# Patient Record
Sex: MALE | Race: WHITE | NOT HISPANIC OR LATINO | URBAN - METROPOLITAN AREA
[De-identification: names, ages, dates, MRNs, and addresses within clinical notes are randomized per-mention and may not be internally consistent; named-entity substitution may affect disease eponyms.]

---

## 2017-01-13 ENCOUNTER — OUTPATIENT (OUTPATIENT)
Dept: OUTPATIENT SERVICES | Facility: HOSPITAL | Age: 67
LOS: 1 days | End: 2017-01-13
Payer: MEDICARE

## 2017-01-13 PROCEDURE — 73564 X-RAY EXAM KNEE 4 OR MORE: CPT

## 2017-01-13 PROCEDURE — 73564 X-RAY EXAM KNEE 4 OR MORE: CPT | Mod: 26,RT

## 2017-04-05 ENCOUNTER — APPOINTMENT (OUTPATIENT)
Dept: HEART AND VASCULAR | Facility: CLINIC | Age: 67
End: 2017-04-05

## 2017-04-05 VITALS
DIASTOLIC BLOOD PRESSURE: 70 MMHG | HEART RATE: 73 BPM | BODY MASS INDEX: 24.59 KG/M2 | WEIGHT: 191.6 LBS | RESPIRATION RATE: 15 BRPM | OXYGEN SATURATION: 97 % | HEIGHT: 74 IN | SYSTOLIC BLOOD PRESSURE: 110 MMHG

## 2017-04-06 LAB
ALBUMIN SERPL ELPH-MCNC: 4.7 G/DL
ALP BLD-CCNC: 73 U/L
ALT SERPL-CCNC: 20 U/L
AST SERPL-CCNC: 23 U/L
BILIRUB DIRECT SERPL-MCNC: 0.3 MG/DL
BILIRUB INDIRECT SERPL-MCNC: 1 MG/DL
BILIRUB SERPL-MCNC: 1.3 MG/DL
CHOLEST SERPL-MCNC: 158 MG/DL
CHOLEST/HDLC SERPL: 2.6 RATIO
HDLC SERPL-MCNC: 61 MG/DL
LDLC SERPL CALC-MCNC: 79 MG/DL
PROT SERPL-MCNC: 7.3 G/DL
TRIGL SERPL-MCNC: 92 MG/DL

## 2017-09-26 VITALS
OXYGEN SATURATION: 98 % | RESPIRATION RATE: 16 BRPM | TEMPERATURE: 98 F | DIASTOLIC BLOOD PRESSURE: 93 MMHG | SYSTOLIC BLOOD PRESSURE: 132 MMHG | HEIGHT: 73 IN | WEIGHT: 191.8 LBS | HEART RATE: 78 BPM

## 2017-09-26 NOTE — ASU PATIENT PROFILE, ADULT - PSH
S/P hernia surgery  Inguinal x2  S/P tonsillectomy  as a child  S/P TURP    Surgery, elective  Right hand surgery

## 2017-09-26 NOTE — ASU PATIENT PROFILE, ADULT - PMH
BPH (benign prostatic hyperplasia)    Edema  mild LLE  Gallbladder polyp    GERD (gastroesophageal reflux disease)    HLD (hyperlipidemia)

## 2017-09-27 ENCOUNTER — OUTPATIENT (OUTPATIENT)
Dept: INPATIENT UNIT | Facility: HOSPITAL | Age: 67
LOS: 1 days | Discharge: HOME | End: 2017-09-27
Payer: MEDICARE

## 2017-09-27 VITALS
DIASTOLIC BLOOD PRESSURE: 71 MMHG | HEART RATE: 772 BPM | OXYGEN SATURATION: 96 % | TEMPERATURE: 98 F | RESPIRATION RATE: 16 BRPM | SYSTOLIC BLOOD PRESSURE: 129 MMHG

## 2017-09-27 DIAGNOSIS — Z90.89 ACQUIRED ABSENCE OF OTHER ORGANS: Chronic | ICD-10-CM

## 2017-09-27 DIAGNOSIS — Z41.9 ENCOUNTER FOR PROCEDURE FOR PURPOSES OTHER THAN REMEDYING HEALTH STATE, UNSPECIFIED: Chronic | ICD-10-CM

## 2017-09-27 DIAGNOSIS — Z98.890 OTHER SPECIFIED POSTPROCEDURAL STATES: Chronic | ICD-10-CM

## 2017-09-27 DIAGNOSIS — Z90.79 ACQUIRED ABSENCE OF OTHER GENITAL ORGAN(S): Chronic | ICD-10-CM

## 2017-09-27 PROCEDURE — 29827 SHO ARTHRS SRG RT8TR CUF RPR: CPT | Mod: RT

## 2017-09-27 PROCEDURE — C1713: CPT

## 2017-09-27 PROCEDURE — 29823 SHO ARTHRS SRG XTNSV DBRDMT: CPT | Mod: RT

## 2017-09-27 PROCEDURE — 29826 SHO ARTHRS SRG DECOMPRESSION: CPT | Mod: RT

## 2017-10-04 DIAGNOSIS — M75.51 BURSITIS OF RIGHT SHOULDER: ICD-10-CM

## 2017-10-04 DIAGNOSIS — M75.101 UNSPECIFIED ROTATOR CUFF TEAR OR RUPTURE OF RIGHT SHOULDER, NOT SPECIFIED AS TRAUMATIC: ICD-10-CM

## 2017-10-04 DIAGNOSIS — M75.41 IMPINGEMENT SYNDROME OF RIGHT SHOULDER: ICD-10-CM
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
## 2017-10-04 DIAGNOSIS — S43.431S SUPERIOR GLENOID LABRUM LESION OF RIGHT SHOULDER, SEQUELA: ICD-10-CM

## 2018-01-25 PROBLEM — R60.9 EDEMA, UNSPECIFIED: Chronic | Status: ACTIVE | Noted: 2017-09-26

## 2018-01-25 PROBLEM — K82.4 CHOLESTEROLOSIS OF GALLBLADDER: Chronic | Status: ACTIVE | Noted: 2017-09-26

## 2018-01-25 PROBLEM — N40.0 BENIGN PROSTATIC HYPERPLASIA WITHOUT LOWER URINARY TRACT SYMPTOMS: Chronic | Status: ACTIVE | Noted: 2017-09-26

## 2018-01-25 PROBLEM — E78.5 HYPERLIPIDEMIA, UNSPECIFIED: Chronic | Status: ACTIVE | Noted: 2017-09-26

## 2018-01-25 PROBLEM — K21.9 GASTRO-ESOPHAGEAL REFLUX DISEASE WITHOUT ESOPHAGITIS: Chronic | Status: ACTIVE | Noted: 2017-09-26

## 2018-02-07 ENCOUNTER — APPOINTMENT (OUTPATIENT)
Dept: HEART AND VASCULAR | Facility: CLINIC | Age: 68
End: 2018-02-07
Payer: MEDICARE

## 2018-02-07 VITALS
OXYGEN SATURATION: 97 % | DIASTOLIC BLOOD PRESSURE: 70 MMHG | BODY MASS INDEX: 24.59 KG/M2 | HEART RATE: 69 BPM | HEIGHT: 74 IN | TEMPERATURE: 97.2 F | WEIGHT: 191.6 LBS | SYSTOLIC BLOOD PRESSURE: 100 MMHG

## 2018-02-07 DIAGNOSIS — R20.2 PARESTHESIA OF SKIN: ICD-10-CM

## 2018-02-07 PROCEDURE — 93000 ELECTROCARDIOGRAM COMPLETE: CPT

## 2018-02-07 PROCEDURE — 36415 COLL VENOUS BLD VENIPUNCTURE: CPT

## 2018-02-07 PROCEDURE — 99214 OFFICE O/P EST MOD 30 MIN: CPT | Mod: 25

## 2018-02-08 LAB
ALBUMIN SERPL ELPH-MCNC: 4.6 G/DL
ALP BLD-CCNC: 74 U/L
ALT SERPL-CCNC: 20 U/L
ANION GAP SERPL CALC-SCNC: 16 MMOL/L
AST SERPL-CCNC: 21 U/L
BASOPHILS # BLD AUTO: 0.04 K/UL
BASOPHILS NFR BLD AUTO: 0.7 %
BILIRUB SERPL-MCNC: 1 MG/DL
BUN SERPL-MCNC: 15 MG/DL
CALCIUM SERPL-MCNC: 9.7 MG/DL
CHLORIDE SERPL-SCNC: 102 MMOL/L
CHOLEST SERPL-MCNC: 149 MG/DL
CHOLEST/HDLC SERPL: 2.9 RATIO
CO2 SERPL-SCNC: 23 MMOL/L
CREAT SERPL-MCNC: 1.08 MG/DL
EOSINOPHIL # BLD AUTO: 0.14 K/UL
EOSINOPHIL NFR BLD AUTO: 2.3 %
FOLATE SERPL-MCNC: >20 NG/ML
GLUCOSE SERPL-MCNC: 91 MG/DL
HBA1C MFR BLD HPLC: 5.3 %
HCT VFR BLD CALC: 45.6 %
HDLC SERPL-MCNC: 52 MG/DL
HGB BLD-MCNC: 14.8 G/DL
IMM GRANULOCYTES NFR BLD AUTO: 0.2 %
LDLC SERPL CALC-MCNC: 82 MG/DL
LYMPHOCYTES # BLD AUTO: 1.44 K/UL
LYMPHOCYTES NFR BLD AUTO: 23.9 %
MAN DIFF?: NORMAL
MCHC RBC-ENTMCNC: 31.6 PG
MCHC RBC-ENTMCNC: 32.5 GM/DL
MCV RBC AUTO: 97.2 FL
MONOCYTES # BLD AUTO: 0.54 K/UL
MONOCYTES NFR BLD AUTO: 9 %
NEUTROPHILS # BLD AUTO: 3.85 K/UL
NEUTROPHILS NFR BLD AUTO: 63.9 %
PLATELET # BLD AUTO: 116 K/UL
POTASSIUM SERPL-SCNC: 4.5 MMOL/L
PROT SERPL-MCNC: 6.9 G/DL
RBC # BLD: 4.69 M/UL
RBC # FLD: 14 %
SODIUM SERPL-SCNC: 141 MMOL/L
TRIGL SERPL-MCNC: 75 MG/DL
TSH SERPL-ACNC: 0.89 UIU/ML
VIT B12 SERPL-MCNC: 844 PG/ML
WBC # FLD AUTO: 6.02 K/UL

## 2018-12-12 ENCOUNTER — APPOINTMENT (OUTPATIENT)
Dept: HEART AND VASCULAR | Facility: CLINIC | Age: 68
End: 2018-12-12
Payer: MEDICARE

## 2018-12-12 VITALS
TEMPERATURE: 97.7 F | RESPIRATION RATE: 15 BRPM | BODY MASS INDEX: 24.51 KG/M2 | SYSTOLIC BLOOD PRESSURE: 120 MMHG | OXYGEN SATURATION: 97 % | HEIGHT: 74 IN | WEIGHT: 191 LBS | DIASTOLIC BLOOD PRESSURE: 80 MMHG | HEART RATE: 80 BPM

## 2018-12-12 PROCEDURE — 93000 ELECTROCARDIOGRAM COMPLETE: CPT

## 2018-12-12 PROCEDURE — 99214 OFFICE O/P EST MOD 30 MIN: CPT

## 2018-12-12 PROCEDURE — 93306 TTE W/DOPPLER COMPLETE: CPT

## 2018-12-12 NOTE — HISTORY OF PRESENT ILLNESS
[FreeTextEntry1] : notes 2-3 months of sharp CP\par happens at rest, self limited and lasts seconds\par feels fine with physical exertion\par otherwise has felt at baseline\par functional capacity unchanged\par no dyspnea or palps

## 2018-12-12 NOTE — PHYSICAL EXAM
[General Appearance - Well Developed] : well developed [Normal Appearance] : normal appearance [Well Groomed] : well groomed [General Appearance - Well Nourished] : well nourished [No Deformities] : no deformities [General Appearance - In No Acute Distress] : no acute distress [Normal Conjunctiva] : the conjunctiva exhibited no abnormalities [Eyelids - No Xanthelasma] : the eyelids demonstrated no xanthelasmas [Normal Oral Mucosa] : normal oral mucosa [No Oral Pallor] : no oral pallor [No Oral Cyanosis] : no oral cyanosis [Normal Jugular Venous A Waves Present] : normal jugular venous A waves present [Normal Jugular Venous V Waves Present] : normal jugular venous V waves present [No Jugular Venous Mayes A Waves] : no jugular venous mayes A waves [Respiration, Rhythm And Depth] : normal respiratory rhythm and effort [Exaggerated Use Of Accessory Muscles For Inspiration] : no accessory muscle use [Auscultation Breath Sounds / Voice Sounds] : lungs were clear to auscultation bilaterally [Heart Rate And Rhythm] : heart rate and rhythm were normal [Heart Sounds] : normal S1 and S2 [Murmurs] : no murmurs present [Abdomen Soft] : soft [Abdomen Tenderness] : non-tender [Abdomen Mass (___ Cm)] : no abdominal mass palpated [Abnormal Walk] : normal gait [Gait - Sufficient For Exercise Testing] : the gait was sufficient for exercise testing [Nail Clubbing] : no clubbing of the fingernails [Cyanosis, Localized] : no localized cyanosis [Petechial Hemorrhages (___cm)] : no petechial hemorrhages [Skin Color & Pigmentation] : normal skin color and pigmentation [] : no rash [No Venous Stasis] : no venous stasis [Skin Lesions] : no skin lesions [No Skin Ulcers] : no skin ulcer [No Xanthoma] : no  xanthoma was observed [Oriented To Time, Place, And Person] : oriented to person, place, and time [Affect] : the affect was normal [Mood] : the mood was normal [No Anxiety] : not feeling anxious

## 2019-03-15 ENCOUNTER — APPOINTMENT (OUTPATIENT)
Dept: HEART AND VASCULAR | Facility: CLINIC | Age: 69
End: 2019-03-15
Payer: MEDICARE

## 2019-03-15 PROCEDURE — 93306 TTE W/DOPPLER COMPLETE: CPT

## 2019-03-15 PROCEDURE — 36415 COLL VENOUS BLD VENIPUNCTURE: CPT

## 2019-03-15 PROCEDURE — 93015 CV STRESS TEST SUPVJ I&R: CPT

## 2019-03-15 PROCEDURE — 99214 OFFICE O/P EST MOD 30 MIN: CPT | Mod: 25

## 2019-03-15 PROCEDURE — 93880 EXTRACRANIAL BILAT STUDY: CPT

## 2019-03-15 NOTE — HISTORY OF PRESENT ILLNESS
[FreeTextEntry1] : notes ongoing  sharp CP\par happens at rest, self limited and lasts seconds\par feels fine with physical exertion\par otherwise has felt at baseline\par functional capacity unchanged\par no dyspnea or palps

## 2019-03-15 NOTE — DISCUSSION/SUMMARY
[FreeTextEntry1] : cp, stress test neg\par consider nuclear stress if sx persist\par rfm discussed \par check labs

## 2019-03-18 LAB
ALBUMIN SERPL ELPH-MCNC: 4.4 G/DL
ALP BLD-CCNC: 71 U/L
ALT SERPL-CCNC: 19 U/L
ANION GAP SERPL CALC-SCNC: 12 MMOL/L
AST SERPL-CCNC: 22 U/L
BASOPHILS # BLD AUTO: 0.05 K/UL
BASOPHILS NFR BLD AUTO: 0.8 %
BILIRUB SERPL-MCNC: 1.4 MG/DL
BUN SERPL-MCNC: 19 MG/DL
CALCIUM SERPL-MCNC: 9.8 MG/DL
CHLORIDE SERPL-SCNC: 106 MMOL/L
CHOLEST SERPL-MCNC: 144 MG/DL
CHOLEST/HDLC SERPL: 2.6 RATIO
CO2 SERPL-SCNC: 26 MMOL/L
CREAT SERPL-MCNC: 1.08 MG/DL
EOSINOPHIL # BLD AUTO: 0.15 K/UL
EOSINOPHIL NFR BLD AUTO: 2.3 %
GLUCOSE SERPL-MCNC: 85 MG/DL
HBA1C MFR BLD HPLC: 5.3 %
HCT VFR BLD CALC: 44.5 %
HDLC SERPL-MCNC: 55 MG/DL
HGB BLD-MCNC: 14 G/DL
IMM GRANULOCYTES NFR BLD AUTO: 0.2 %
LDLC SERPL CALC-MCNC: 75 MG/DL
LYMPHOCYTES # BLD AUTO: 1.43 K/UL
LYMPHOCYTES NFR BLD AUTO: 22.3 %
MAN DIFF?: NORMAL
MCHC RBC-ENTMCNC: 31.1 PG
MCHC RBC-ENTMCNC: 31.5 GM/DL
MCV RBC AUTO: 98.9 FL
MONOCYTES # BLD AUTO: 0.59 K/UL
MONOCYTES NFR BLD AUTO: 9.2 %
NEUTROPHILS # BLD AUTO: 4.17 K/UL
NEUTROPHILS NFR BLD AUTO: 65.2 %
PLATELET # BLD AUTO: 117 K/UL
POTASSIUM SERPL-SCNC: 5 MMOL/L
PROT SERPL-MCNC: 6.6 G/DL
RBC # BLD: 4.5 M/UL
RBC # FLD: 13.3 %
SODIUM SERPL-SCNC: 143 MMOL/L
TRIGL SERPL-MCNC: 68 MG/DL
TSH SERPL-ACNC: 0.89 UIU/ML
WBC # FLD AUTO: 6.4 K/UL

## 2019-03-26 ENCOUNTER — APPOINTMENT (OUTPATIENT)
Dept: HEART AND VASCULAR | Facility: CLINIC | Age: 69
End: 2019-03-26
Payer: MEDICARE

## 2019-03-26 PROCEDURE — 99214 OFFICE O/P EST MOD 30 MIN: CPT | Mod: 25

## 2019-03-26 PROCEDURE — 78452 HT MUSCLE IMAGE SPECT MULT: CPT

## 2019-03-26 PROCEDURE — 93015 CV STRESS TEST SUPVJ I&R: CPT

## 2019-03-26 PROCEDURE — A9500: CPT

## 2019-03-26 NOTE — DISCUSSION/SUMMARY
[___ Month(s)] : [unfilled] month(s) [With Me] : with me [FreeTextEntry1] : mildly abln scan, but no angina on treadmill and no high risk features\par I would conclude that the cp he is having is unrelated to CAD\par would treat medically \par annual stress test\par consider intervention for angina sx if they occur

## 2019-03-31 ENCOUNTER — RX RENEWAL (OUTPATIENT)
Age: 69
End: 2019-03-31

## 2019-05-01 ENCOUNTER — OUTPATIENT (OUTPATIENT)
Dept: OUTPATIENT SERVICES | Facility: HOSPITAL | Age: 69
LOS: 1 days | Discharge: HOME | End: 2019-05-01
Payer: MEDICARE

## 2019-05-01 VITALS
HEART RATE: 76 BPM | HEIGHT: 73 IN | TEMPERATURE: 99 F | DIASTOLIC BLOOD PRESSURE: 81 MMHG | SYSTOLIC BLOOD PRESSURE: 134 MMHG | OXYGEN SATURATION: 99 % | RESPIRATION RATE: 20 BRPM | WEIGHT: 190.04 LBS

## 2019-05-01 VITALS
TEMPERATURE: 97 F | RESPIRATION RATE: 9 BRPM | DIASTOLIC BLOOD PRESSURE: 76 MMHG | SYSTOLIC BLOOD PRESSURE: 123 MMHG | OXYGEN SATURATION: 97 % | HEART RATE: 70 BPM

## 2019-05-01 DIAGNOSIS — Z41.9 ENCOUNTER FOR PROCEDURE FOR PURPOSES OTHER THAN REMEDYING HEALTH STATE, UNSPECIFIED: Chronic | ICD-10-CM

## 2019-05-01 DIAGNOSIS — Z98.890 OTHER SPECIFIED POSTPROCEDURAL STATES: Chronic | ICD-10-CM

## 2019-05-01 DIAGNOSIS — Z90.79 ACQUIRED ABSENCE OF OTHER GENITAL ORGAN(S): Chronic | ICD-10-CM

## 2019-05-01 DIAGNOSIS — Z90.89 ACQUIRED ABSENCE OF OTHER ORGANS: Chronic | ICD-10-CM

## 2019-05-01 PROCEDURE — C1713: CPT

## 2019-05-01 PROCEDURE — 29827 SHO ARTHRS SRG RT8TR CUF RPR: CPT | Mod: LT

## 2019-05-01 PROCEDURE — C1889: CPT

## 2019-05-01 PROCEDURE — 29826 SHO ARTHRS SRG DECOMPRESSION: CPT | Mod: LT

## 2019-05-01 PROCEDURE — 29823 SHO ARTHRS SRG XTNSV DBRDMT: CPT | Mod: LT

## 2019-05-01 PROCEDURE — 29824 SHO ARTHRS SRG DSTL CLAVICLC: CPT | Mod: LT

## 2019-05-01 RX ORDER — OXYCODONE AND ACETAMINOPHEN 5; 325 MG/1; MG/1
2 TABLET ORAL EVERY 4 HOURS
Qty: 0 | Refills: 0 | Status: DISCONTINUED | OUTPATIENT
Start: 2019-05-01 | End: 2019-05-01

## 2019-05-01 RX ORDER — OXYCODONE AND ACETAMINOPHEN 5; 325 MG/1; MG/1
1 TABLET ORAL EVERY 4 HOURS
Qty: 0 | Refills: 0 | Status: DISCONTINUED | OUTPATIENT
Start: 2019-05-01 | End: 2019-05-01

## 2019-05-01 RX ORDER — SODIUM CHLORIDE 9 MG/ML
1000 INJECTION, SOLUTION INTRAVENOUS
Qty: 0 | Refills: 0 | Status: DISCONTINUED | OUTPATIENT
Start: 2019-05-01 | End: 2019-05-01

## 2019-05-01 RX ORDER — MORPHINE SULFATE 50 MG/1
4 CAPSULE, EXTENDED RELEASE ORAL
Qty: 0 | Refills: 0 | Status: DISCONTINUED | OUTPATIENT
Start: 2019-05-01 | End: 2019-05-01

## 2019-05-01 NOTE — PACU DISCHARGE NOTE - COMMENTS
Pt discharged from PACU to home with spouse Nahomy as escort.  Pt had snack with fluid and tolerated well.  Ambulate to bathroom and voided approx 100cc urine.  Pt  walked to lobby for transport in stable condition.

## 2019-12-23 ENCOUNTER — APPOINTMENT (OUTPATIENT)
Dept: HEART AND VASCULAR | Facility: CLINIC | Age: 69
End: 2019-12-23
Payer: MEDICARE

## 2019-12-23 VITALS
HEART RATE: 80 BPM | SYSTOLIC BLOOD PRESSURE: 124 MMHG | TEMPERATURE: 97.6 F | BODY MASS INDEX: 24.64 KG/M2 | OXYGEN SATURATION: 97 % | DIASTOLIC BLOOD PRESSURE: 80 MMHG | WEIGHT: 192 LBS | RESPIRATION RATE: 15 BRPM | HEIGHT: 74 IN

## 2019-12-23 PROCEDURE — 93000 ELECTROCARDIOGRAM COMPLETE: CPT

## 2019-12-23 PROCEDURE — 99214 OFFICE O/P EST MOD 30 MIN: CPT

## 2019-12-23 NOTE — PHYSICAL EXAM
[Well Groomed] : well groomed [Normal Appearance] : normal appearance [General Appearance - Well Developed] : well developed [No Deformities] : no deformities [General Appearance - Well Nourished] : well nourished [General Appearance - In No Acute Distress] : no acute distress [Normal Conjunctiva] : the conjunctiva exhibited no abnormalities [Eyelids - No Xanthelasma] : the eyelids demonstrated no xanthelasmas [Normal Oral Mucosa] : normal oral mucosa [No Oral Pallor] : no oral pallor [No Oral Cyanosis] : no oral cyanosis [Normal Jugular Venous A Waves Present] : normal jugular venous A waves present [No Jugular Venous Mayes A Waves] : no jugular venous mayes A waves [Normal Jugular Venous V Waves Present] : normal jugular venous V waves present [Auscultation Breath Sounds / Voice Sounds] : lungs were clear to auscultation bilaterally [Respiration, Rhythm And Depth] : normal respiratory rhythm and effort [Exaggerated Use Of Accessory Muscles For Inspiration] : no accessory muscle use [Heart Sounds] : normal S1 and S2 [Murmurs] : no murmurs present [Heart Rate And Rhythm] : heart rate and rhythm were normal [Abdomen Soft] : soft [Abdomen Tenderness] : non-tender [Abdomen Mass (___ Cm)] : no abdominal mass palpated [Abnormal Walk] : normal gait [Gait - Sufficient For Exercise Testing] : the gait was sufficient for exercise testing [Nail Clubbing] : no clubbing of the fingernails [Petechial Hemorrhages (___cm)] : no petechial hemorrhages [Cyanosis, Localized] : no localized cyanosis [Skin Color & Pigmentation] : normal skin color and pigmentation [] : no rash [No Venous Stasis] : no venous stasis [No Skin Ulcers] : no skin ulcer [Skin Lesions] : no skin lesions [No Xanthoma] : no  xanthoma was observed [Oriented To Time, Place, And Person] : oriented to person, place, and time [Affect] : the affect was normal [Mood] : the mood was normal [No Anxiety] : not feeling anxious Advancement Flap (Double) Text: The defect edges were debeveled with a #15 scalpel blade.  Given the location of the defect and the proximity to free margins a double advancement flap was deemed most appropriate.  Using a sterile surgical marker, the appropriate advancement flaps were drawn incorporating the defect and placing the expected incisions within the relaxed skin tension lines where possible.    The area thus outlined was incised deep to adipose tissue with a #15 scalpel blade.  The skin margins were undermined to an appropriate distance in all directions utilizing iris scissors.

## 2019-12-23 NOTE — HISTORY OF PRESENT ILLNESS
[FreeTextEntry1] : still notes ongoing  sharp CP\par happens at rest, self limited and lasts seconds\par feels fine with physical exertion\par otherwise has felt at baseline\par functional capacity unchanged\par no dyspnea or palps\par pain is reproducible on the ant chest wall

## 2019-12-23 NOTE — DISCUSSION/SUMMARY
[FreeTextEntry3] : echo, crtd and treadmill stress [FreeTextEntry1] : atyp cp- return for echo, crtd and stress test\par reassured him that this is not angina\par ok to d/c daily asa\par cont statin

## 2020-02-05 ENCOUNTER — APPOINTMENT (OUTPATIENT)
Dept: HEART AND VASCULAR | Facility: CLINIC | Age: 70
End: 2020-02-05
Payer: MEDICARE

## 2020-02-05 DIAGNOSIS — R09.89 OTHER SPECIFIED SYMPTOMS AND SIGNS INVOLVING THE CIRCULATORY AND RESPIRATORY SYSTEMS: ICD-10-CM

## 2020-02-05 PROCEDURE — 93306 TTE W/DOPPLER COMPLETE: CPT

## 2020-02-05 PROCEDURE — 99214 OFFICE O/P EST MOD 30 MIN: CPT | Mod: 25

## 2020-02-05 PROCEDURE — 93015 CV STRESS TEST SUPVJ I&R: CPT

## 2020-02-05 PROCEDURE — 93880 EXTRACRANIAL BILAT STUDY: CPT

## 2020-02-05 NOTE — DISCUSSION/SUMMARY
[___ Month(s)] : [unfilled] month(s) [With Me] : with me [FreeTextEntry1] : results discussed \par rfm discussed\par no sx on treadmill

## 2020-10-21 ENCOUNTER — APPOINTMENT (OUTPATIENT)
Dept: HEART AND VASCULAR | Facility: CLINIC | Age: 70
End: 2020-10-21
Payer: MEDICARE

## 2020-10-21 VITALS
WEIGHT: 195 LBS | RESPIRATION RATE: 16 BRPM | SYSTOLIC BLOOD PRESSURE: 116 MMHG | HEART RATE: 60 BPM | BODY MASS INDEX: 25.84 KG/M2 | OXYGEN SATURATION: 99 % | HEIGHT: 73 IN | DIASTOLIC BLOOD PRESSURE: 62 MMHG | TEMPERATURE: 97.6 F

## 2020-10-21 PROCEDURE — 99214 OFFICE O/P EST MOD 30 MIN: CPT

## 2020-10-21 PROCEDURE — 93000 ELECTROCARDIOGRAM COMPLETE: CPT

## 2020-10-21 NOTE — DISCUSSION/SUMMARY
[FreeTextEntry1] : Asymptomatic APC's\par stress test and echo in the last year were OK\par no change in functional capacity\par no further treatment at this point\par stable/cleared for the elective eye surgery

## 2020-10-21 NOTE — PHYSICAL EXAM
[General Appearance - Well Developed] : well developed [Normal Appearance] : normal appearance [Well Groomed] : well groomed [General Appearance - Well Nourished] : well nourished [No Deformities] : no deformities [General Appearance - In No Acute Distress] : no acute distress [Normal Conjunctiva] : the conjunctiva exhibited no abnormalities [Eyelids - No Xanthelasma] : the eyelids demonstrated no xanthelasmas [Normal Oral Mucosa] : normal oral mucosa [No Oral Pallor] : no oral pallor [No Oral Cyanosis] : no oral cyanosis [Normal Jugular Venous A Waves Present] : normal jugular venous A waves present [Normal Jugular Venous V Waves Present] : normal jugular venous V waves present [No Jugular Venous Mayes A Waves] : no jugular venous mayse A waves [Respiration, Rhythm And Depth] : normal respiratory rhythm and effort [Exaggerated Use Of Accessory Muscles For Inspiration] : no accessory muscle use [Auscultation Breath Sounds / Voice Sounds] : lungs were clear to auscultation bilaterally [Heart Rate And Rhythm] : heart rate and rhythm were normal [Heart Sounds] : normal S1 and S2 [Murmurs] : no murmurs present [Abdomen Soft] : soft [Abdomen Tenderness] : non-tender [Abdomen Mass (___ Cm)] : no abdominal mass palpated [Abnormal Walk] : normal gait [Gait - Sufficient For Exercise Testing] : the gait was sufficient for exercise testing [Nail Clubbing] : no clubbing of the fingernails [Cyanosis, Localized] : no localized cyanosis [Petechial Hemorrhages (___cm)] : no petechial hemorrhages [Skin Color & Pigmentation] : normal skin color and pigmentation [] : no rash [No Venous Stasis] : no venous stasis [Skin Lesions] : no skin lesions [No Skin Ulcers] : no skin ulcer [No Xanthoma] : no  xanthoma was observed [Oriented To Time, Place, And Person] : oriented to person, place, and time [Affect] : the affect was normal [Mood] : the mood was normal [No Anxiety] : not feeling anxious

## 2020-10-21 NOTE — HISTORY OF PRESENT ILLNESS
[FreeTextEntry1] : for cardiac f/u, clearance for eye surgery\par pcp noted APC's on routine EKG\par walking every day for exercise w/o chest discomfort or dyspnea\par feels fine with physical exertion\par denies any palpitations \par denies syncope\par he has cut back on caffeine and stopped cigars already

## 2021-03-01 ENCOUNTER — RX RENEWAL (OUTPATIENT)
Age: 71
End: 2021-03-01

## 2021-04-28 ENCOUNTER — APPOINTMENT (OUTPATIENT)
Dept: HEART AND VASCULAR | Facility: CLINIC | Age: 71
End: 2021-04-28
Payer: MEDICARE

## 2021-04-28 VITALS
DIASTOLIC BLOOD PRESSURE: 70 MMHG | SYSTOLIC BLOOD PRESSURE: 118 MMHG | RESPIRATION RATE: 16 BRPM | OXYGEN SATURATION: 98 % | TEMPERATURE: 97.6 F | WEIGHT: 192 LBS | HEIGHT: 73 IN | BODY MASS INDEX: 25.45 KG/M2 | HEART RATE: 58 BPM

## 2021-04-28 DIAGNOSIS — Z01.818 ENCOUNTER FOR OTHER PREPROCEDURAL EXAMINATION: ICD-10-CM

## 2021-04-28 PROCEDURE — 99214 OFFICE O/P EST MOD 30 MIN: CPT | Mod: 25

## 2021-04-28 PROCEDURE — 93306 TTE W/DOPPLER COMPLETE: CPT

## 2021-04-28 PROCEDURE — 93015 CV STRESS TEST SUPVJ I&R: CPT

## 2021-04-28 PROCEDURE — 93880 EXTRACRANIAL BILAT STUDY: CPT

## 2021-04-28 NOTE — PHYSICAL EXAM
[Well Developed] : well developed [Well Nourished] : well nourished [No Acute Distress] : no acute distress [Normal Conjunctiva] : normal conjunctiva [Normal Venous Pressure] : normal venous pressure [No Carotid Bruit] : no carotid bruit [Normal S1, S2] : normal S1, S2 [No Murmur] : no murmur [No Rub] : no rub [No Gallop] : no gallop [Clear Lung Fields] : clear lung fields [Good Air Entry] : good air entry [No Respiratory Distress] : no respiratory distress  [Soft] : abdomen soft [Non Tender] : non-tender [Normal Bowel Sounds] : normal bowel sounds [No Masses/organomegaly] : no masses/organomegaly [Normal Gait] : normal gait [No Edema] : no edema [No Cyanosis] : no cyanosis [No Clubbing] : no clubbing [No Varicosities] : no varicosities [No Rash] : no rash [No Skin Lesions] : no skin lesions [Moves all extremities] : moves all extremities [No Focal Deficits] : no focal deficits [Normal Speech] : normal speech [Alert and Oriented] : alert and oriented [Normal memory] : normal memory

## 2021-04-28 NOTE — HISTORY OF PRESENT ILLNESS
[FreeTextEntry1] : needs clearance for knee replacement on 5/26 at [facility]\par walking every day for exercise w/o chest discomfort or dyspnea\par feels fine with physical exertion\par denies any palpitations \par denies syncope\par no prior problems with anesthesia\par no bleeding or clotting problems in the past

## 2021-04-28 NOTE — DISCUSSION/SUMMARY
[FreeTextEntry1] : Normal stress test, good functional capacity\par echo OK as well\par he is stable/cleared to proceed with elective knee replacement

## 2021-04-29 LAB
ALBUMIN SERPL ELPH-MCNC: 4.5 G/DL
ALP BLD-CCNC: 82 U/L
ALT SERPL-CCNC: 16 U/L
ANION GAP SERPL CALC-SCNC: 10 MMOL/L
APTT BLD: 32.9 SEC
AST SERPL-CCNC: 20 U/L
BASOPHILS # BLD AUTO: 0.05 K/UL
BASOPHILS NFR BLD AUTO: 0.9 %
BILIRUB SERPL-MCNC: 1.2 MG/DL
BUN SERPL-MCNC: 17 MG/DL
CALCIUM SERPL-MCNC: 9.8 MG/DL
CHLORIDE SERPL-SCNC: 107 MMOL/L
CHOLEST SERPL-MCNC: 147 MG/DL
CO2 SERPL-SCNC: 25 MMOL/L
CREAT SERPL-MCNC: 1.09 MG/DL
EOSINOPHIL # BLD AUTO: 0.19 K/UL
EOSINOPHIL NFR BLD AUTO: 3.5 %
ESTIMATED AVERAGE GLUCOSE: 111 MG/DL
GLUCOSE SERPL-MCNC: 110 MG/DL
HBA1C MFR BLD HPLC: 5.5 %
HCT VFR BLD CALC: 46.5 %
HDLC SERPL-MCNC: 60 MG/DL
HGB BLD-MCNC: 14.9 G/DL
IMM GRANULOCYTES NFR BLD AUTO: 0.2 %
INR PPP: 0.93 RATIO
LDLC SERPL CALC-MCNC: 74 MG/DL
LYMPHOCYTES # BLD AUTO: 1.21 K/UL
LYMPHOCYTES NFR BLD AUTO: 22 %
MAN DIFF?: NORMAL
MCHC RBC-ENTMCNC: 31.2 PG
MCHC RBC-ENTMCNC: 32 GM/DL
MCV RBC AUTO: 97.5 FL
MONOCYTES # BLD AUTO: 0.62 K/UL
MONOCYTES NFR BLD AUTO: 11.3 %
NEUTROPHILS # BLD AUTO: 3.42 K/UL
NEUTROPHILS NFR BLD AUTO: 62.1 %
NONHDLC SERPL-MCNC: 87 MG/DL
PLATELET # BLD AUTO: 93 K/UL
POTASSIUM SERPL-SCNC: 5 MMOL/L
PROT SERPL-MCNC: 6.8 G/DL
PT BLD: 11.1 SEC
RBC # BLD: 4.77 M/UL
RBC # FLD: 13.8 %
SODIUM SERPL-SCNC: 142 MMOL/L
TRIGL SERPL-MCNC: 64 MG/DL
WBC # FLD AUTO: 5.5 K/UL

## 2021-10-15 ENCOUNTER — NON-APPOINTMENT (OUTPATIENT)
Age: 71
End: 2021-10-15

## 2021-11-23 ENCOUNTER — APPOINTMENT (OUTPATIENT)
Dept: HEART AND VASCULAR | Facility: CLINIC | Age: 71
End: 2021-11-23
Payer: MEDICARE

## 2021-11-23 VITALS
TEMPERATURE: 98 F | SYSTOLIC BLOOD PRESSURE: 120 MMHG | OXYGEN SATURATION: 99 % | HEART RATE: 78 BPM | DIASTOLIC BLOOD PRESSURE: 75 MMHG | RESPIRATION RATE: 12 BRPM

## 2021-11-23 PROCEDURE — 99214 OFFICE O/P EST MOD 30 MIN: CPT

## 2021-11-23 NOTE — DISCUSSION/SUMMARY
[FreeTextEntry1] : reviewed recent stress test and echo from april and EKG from last week (unchanged)\par he is stable.cleared to proceed with the planned eye surgery

## 2021-11-23 NOTE — HISTORY OF PRESENT ILLNESS
[FreeTextEntry1] : needs clearance for ophthalmic surgery 12/2/21\par walking every day for exercise w/o chest discomfort or dyspnea\par feels fine with physical exertion\par denies any palpitations \par denies syncope\par no prior problems with anesthesia- tolerated recent knee surgery without complication\par

## 2022-02-16 ENCOUNTER — RX RENEWAL (OUTPATIENT)
Age: 72
End: 2022-02-16

## 2022-04-28 ENCOUNTER — APPOINTMENT (OUTPATIENT)
Dept: HEART AND VASCULAR | Facility: CLINIC | Age: 72
End: 2022-04-28
Payer: MEDICARE

## 2022-04-28 VITALS
SYSTOLIC BLOOD PRESSURE: 118 MMHG | WEIGHT: 197 LBS | BODY MASS INDEX: 26.11 KG/M2 | RESPIRATION RATE: 12 BRPM | DIASTOLIC BLOOD PRESSURE: 70 MMHG | HEIGHT: 73 IN | HEART RATE: 88 BPM | TEMPERATURE: 97.7 F | OXYGEN SATURATION: 97 %

## 2022-04-28 DIAGNOSIS — I25.10 ATHEROSCLEROTIC HEART DISEASE OF NATIVE CORONARY ARTERY W/OUT ANGINA PECTORIS: ICD-10-CM

## 2022-04-28 PROCEDURE — 93000 ELECTROCARDIOGRAM COMPLETE: CPT

## 2022-04-28 PROCEDURE — 99213 OFFICE O/P EST LOW 20 MIN: CPT

## 2022-04-28 NOTE — HISTORY OF PRESENT ILLNESS
[FreeTextEntry1] : f/u cad\par walking every day for exercise w/o chest discomfort or dyspnea\par feels fine with physical exertion\par denies any palpitations \par denies syncope\par may need additional knee surgery\par

## 2022-05-26 RX ORDER — CHLORHEXIDINE GLUCONATE 213 G/1000ML
1 SOLUTION TOPICAL ONCE
Refills: 0 | Status: DISCONTINUED | OUTPATIENT
Start: 2022-05-27 | End: 2022-05-27

## 2022-05-26 NOTE — ASU PATIENT PROFILE, ADULT - NSICDXPASTSURGICALHX_GEN_ALL_CORE_FT
PAST SURGICAL HISTORY:  S/P hernia surgery Inguinal x2    S/P tonsillectomy as a child    S/P TURP     Surgery, elective Right hand surgery

## 2022-05-26 NOTE — ASU PATIENT PROFILE, ADULT - FALL HARM RISK - UNIVERSAL INTERVENTIONS
Bed in lowest position, wheels locked, appropriate side rails in place/Call bell, personal items and telephone in reach/Instruct patient to call for assistance before getting out of bed or chair/Non-slip footwear when patient is out of bed/[place] to call system/Physically safe environment - no spills, clutter or unnecessary equipment/Purposeful Proactive Rounding/Room/bathroom lighting operational, light cord in reach

## 2022-05-26 NOTE — ASU PATIENT PROFILE, ADULT - NSICDXPASTMEDICALHX_GEN_ALL_CORE_FT
PAST MEDICAL HISTORY:  BPH (benign prostatic hyperplasia)     Edema mild LLE    Gallbladder polyp     GERD (gastroesophageal reflux disease)     H/O cardiac arrhythmia     HLD (hyperlipidemia)

## 2022-05-27 ENCOUNTER — OUTPATIENT (OUTPATIENT)
Dept: OUTPATIENT SERVICES | Facility: HOSPITAL | Age: 72
LOS: 1 days | Discharge: HOME | End: 2022-05-27
Payer: MEDICARE

## 2022-05-27 ENCOUNTER — TRANSCRIPTION ENCOUNTER (OUTPATIENT)
Age: 72
End: 2022-05-27

## 2022-05-27 ENCOUNTER — RESULT REVIEW (OUTPATIENT)
Age: 72
End: 2022-05-27

## 2022-05-27 VITALS
TEMPERATURE: 98 F | WEIGHT: 195.33 LBS | RESPIRATION RATE: 16 BRPM | SYSTOLIC BLOOD PRESSURE: 110 MMHG | HEIGHT: 73 IN | DIASTOLIC BLOOD PRESSURE: 63 MMHG | OXYGEN SATURATION: 100 % | HEART RATE: 80 BPM

## 2022-05-27 VITALS
SYSTOLIC BLOOD PRESSURE: 118 MMHG | RESPIRATION RATE: 16 BRPM | HEART RATE: 58 BPM | TEMPERATURE: 98 F | OXYGEN SATURATION: 98 % | DIASTOLIC BLOOD PRESSURE: 78 MMHG

## 2022-05-27 DIAGNOSIS — Z90.79 ACQUIRED ABSENCE OF OTHER GENITAL ORGAN(S): Chronic | ICD-10-CM

## 2022-05-27 DIAGNOSIS — Z41.9 ENCOUNTER FOR PROCEDURE FOR PURPOSES OTHER THAN REMEDYING HEALTH STATE, UNSPECIFIED: Chronic | ICD-10-CM

## 2022-05-27 DIAGNOSIS — Z90.89 ACQUIRED ABSENCE OF OTHER ORGANS: Chronic | ICD-10-CM

## 2022-05-27 DIAGNOSIS — Z98.890 OTHER SPECIFIED POSTPROCEDURAL STATES: Chronic | ICD-10-CM

## 2022-05-27 PROCEDURE — 88304 TISSUE EXAM BY PATHOLOGIST: CPT | Mod: 26

## 2022-05-27 PROCEDURE — ZZZZZ: CPT

## 2022-05-27 RX ORDER — DOCUSATE SODIUM 100 MG
1 CAPSULE ORAL
Qty: 0 | Refills: 0 | DISCHARGE

## 2022-05-27 RX ORDER — SODIUM CHLORIDE 9 MG/ML
500 INJECTION, SOLUTION INTRAVENOUS
Refills: 0 | Status: DISCONTINUED | OUTPATIENT
Start: 2022-05-27 | End: 2022-05-27

## 2022-05-27 RX ORDER — FAMOTIDINE 10 MG/ML
1 INJECTION INTRAVENOUS
Qty: 0 | Refills: 0 | DISCHARGE

## 2022-05-27 RX ORDER — FINASTERIDE 5 MG/1
1 TABLET, FILM COATED ORAL
Qty: 0 | Refills: 0 | DISCHARGE

## 2022-05-27 RX ORDER — ATORVASTATIN CALCIUM 80 MG/1
1 TABLET, FILM COATED ORAL
Qty: 0 | Refills: 0 | DISCHARGE

## 2022-05-27 RX ORDER — ONDANSETRON 8 MG/1
4 TABLET, FILM COATED ORAL ONCE
Refills: 0 | Status: DISCONTINUED | OUTPATIENT
Start: 2022-05-27 | End: 2022-05-27

## 2022-05-27 RX ADMIN — SODIUM CHLORIDE 100 MILLILITER(S): 9 INJECTION, SOLUTION INTRAVENOUS at 09:48

## 2022-05-27 NOTE — ASU DISCHARGE PLAN (ADULT/PEDIATRIC) - HAVE YOU HAD A FIRST COVID-19 BOOSTER?
August 27, 2020        Clyde Faust  [address]  [address] [facility] [address]    To Whom It May Concern:    This is to certify Clyde Faust was evaluated on 08/27/20 and {Covid/underlying conditions list :1228260}  The Coronavirus is a rapidly evolving situation and recommendations are changing regularly to prevent spread of the disease and further loss of life.    Thank you for your understanding during these unusual times.        Electronically signed by:   Deena Ward MD                 {Enc Dept/Call Ctr Address:[id number]}    
      To whom it may concern:    Clyde Faust is a patient of Deena Ward MD. On 8/27/2020 she was thought to have mild symptoms of COVID 19.    At this time we are recommending Clyde Faust to stay home from work under self quarantine for seven days and until at least 3 days after all his symptoms have cleared. She began to stay home on 8/25/2020 and would therefore not be eligible to return until at least 9/4/2020.      Obviously the Corona virus is a rapidly evolving situation and recommendations are changing regularly to prevent spread of the disease and further loss of life.    If you have any questions please call our office.       Signature: Tiana Blanco RN on behalf of  Deena Ward MD  [address] [place] [address] [phone] fax  
      To whom it may concern:    Clyde Faust is a patient of Deena Ward MD. On 8/27/2020 she was thought to have mild symptoms of COVID 19.    At this time we are recommending Clyde Faust to stay home from work under self quarantine for seven days and until at least 3 days after all his symptoms have cleared. She began to stay home on 8/25/2020 and would therefore not be eligible to return until at least 9/4/2020.      Obviously the Corona virus is a rapidly evolving situation and recommendations are changing regularly to prevent spread of the disease and further loss of life.    If you have any questions please call our office.       Signature: Tiana Blanco RN on behalf of  Deena Ward MD  [address] [place] [address] [phone] fax  
Yes

## 2022-05-27 NOTE — ASU DISCHARGE PLAN (ADULT/PEDIATRIC) - NS MD DC FALL RISK RISK
For information on Fall & Injury Prevention, visit: [email or web address] OR  [email or web address] OR  https://www.cdc.gov/steadi/patient.html

## 2022-05-27 NOTE — ASU DISCHARGE PLAN (ADULT/PEDIATRIC) - CARE PROVIDER_API CALL
Wallace Amador  ORTHOP[facility]ORTS [facility]INE  [address], [address]  [place], [place] [address]  Phone: [phone]  Fax: [phone]  Follow Up Time:

## 2022-05-27 NOTE — ASU DISCHARGE PLAN (ADULT/PEDIATRIC) - ASU DC SPECIAL INSTRUCTIONSFT
Dr. Cornell's office will call you to set up a follow-up appointment. Elevate arm and keep dressing/sling dry. Take pain RX given by Dr. Amador as needed and indicated.

## 2022-06-10 LAB — SURGICAL PATHOLOGY STUDY: SIGNIFICANT CHANGE UP

## 2025-01-27 PROBLEM — Z86.79 PERSONAL HISTORY OF OTHER DISEASES OF THE CIRCULATORY SYSTEM: Chronic | Status: ACTIVE | Noted: 2022-05-27

## 2025-01-30 NOTE — ASU PATIENT PROFILE, ADULT - NSICDXPASTSURGICALHX_GEN_ALL_CORE_FT
PAST SURGICAL HISTORY:  History of repair of hip fracture     S/P hernia surgery Inguinal x2    S/P tonsillectomy as a child    S/P TURP     Surgery, elective Right hand surgery     PAST SURGICAL HISTORY:  History of hip surgery     History of repair of hip fracture     S/P hernia surgery Inguinal x2    S/P tonsillectomy as a child    S/P TURP     Surgery, elective Right hand surgery

## 2025-01-30 NOTE — ASU PATIENT PROFILE, ADULT - FALL HARM RISK - UNIVERSAL INTERVENTIONS
Bed in lowest position, wheels locked, appropriate side rails in place/Call bell, personal items and telephone in reach/Instruct patient to call for assistance before getting out of bed or chair/Non-slip footwear when patient is out of bed/[place] to call system/Physically safe environment - no spills, clutter or unnecessary equipment/Purposeful Proactive Rounding/Room/bathroom lighting operational, light cord in reach

## 2025-01-30 NOTE — ASU PATIENT PROFILE, ADULT - NS PREOP UNDERSTANDS INFO
Nothing to eat or drink after 12 mid-night 1/30/2025/ photo ID and insurance card/ comfortable open front clothing/ wife will take him home/yes 53

## 2025-01-31 ENCOUNTER — OUTPATIENT (OUTPATIENT)
Dept: OUTPATIENT SERVICES | Facility: HOSPITAL | Age: 75
LOS: 1 days | Discharge: HOME | End: 2025-01-31
Payer: MEDICARE

## 2025-01-31 VITALS
WEIGHT: 200.84 LBS | HEART RATE: 65 BPM | TEMPERATURE: 99 F | RESPIRATION RATE: 16 BRPM | OXYGEN SATURATION: 99 % | DIASTOLIC BLOOD PRESSURE: 71 MMHG | HEIGHT: 73 IN | SYSTOLIC BLOOD PRESSURE: 119 MMHG

## 2025-01-31 VITALS
RESPIRATION RATE: 16 BRPM | DIASTOLIC BLOOD PRESSURE: 69 MMHG | SYSTOLIC BLOOD PRESSURE: 115 MMHG | OXYGEN SATURATION: 98 % | TEMPERATURE: 99 F | HEART RATE: 63 BPM

## 2025-01-31 DIAGNOSIS — Z41.9 ENCOUNTER FOR PROCEDURE FOR PURPOSES OTHER THAN REMEDYING HEALTH STATE, UNSPECIFIED: Chronic | ICD-10-CM

## 2025-01-31 DIAGNOSIS — Z90.79 ACQUIRED ABSENCE OF OTHER GENITAL ORGAN(S): Chronic | ICD-10-CM

## 2025-01-31 DIAGNOSIS — Z90.89 ACQUIRED ABSENCE OF OTHER ORGANS: Chronic | ICD-10-CM

## 2025-01-31 DIAGNOSIS — Z98.890 OTHER SPECIFIED POSTPROCEDURAL STATES: Chronic | ICD-10-CM

## 2025-01-31 PROCEDURE — 88305 TISSUE EXAM BY PATHOLOGIST: CPT | Mod: 26

## 2025-01-31 RX ORDER — OXYCODONE HYDROCHLORIDE 30 MG/1
5 TABLET ORAL ONCE
Refills: 0 | Status: DISCONTINUED | OUTPATIENT
Start: 2025-01-31 | End: 2025-01-31

## 2025-01-31 RX ORDER — ACETAMINOPHEN 160 MG/5ML
650 SUSPENSION ORAL ONCE
Refills: 0 | Status: DISCONTINUED | OUTPATIENT
Start: 2025-01-31 | End: 2025-01-31

## 2025-01-31 RX ORDER — ATORVASTATIN CALCIUM 80 MG/1
1 TABLET, FILM COATED ORAL
Refills: 0 | DISCHARGE

## 2025-01-31 RX ORDER — ONDANSETRON 4 MG/1
4 TABLET, ORALLY DISINTEGRATING ORAL ONCE
Refills: 0 | Status: DISCONTINUED | OUTPATIENT
Start: 2025-01-31 | End: 2025-01-31

## 2025-01-31 RX ORDER — SODIUM CHLORIDE 9 G/ML
1000 INJECTION, SOLUTION INTRAVENOUS
Refills: 0 | Status: DISCONTINUED | OUTPATIENT
Start: 2025-01-31 | End: 2025-01-31

## 2025-01-31 RX ORDER — OMEPRAZOLE 20 MG/1
1 CAPSULE, DELAYED RELEASE ORAL
Refills: 0 | DISCHARGE

## 2025-01-31 RX ADMIN — SODIUM CHLORIDE 100 MILLILITER(S): 9 INJECTION, SOLUTION INTRAVENOUS at 10:20

## 2025-01-31 NOTE — ASU DISCHARGE PLAN (ADULT/PEDIATRIC) - ASU DC SPECIAL INSTRUCTIONSFT
Keep dressing dry.   Elevate left hand and wrist.   Non-strenuous activity left upper extremity.   Call Dr. Amador's office to schedule follow up appointment.

## 2025-01-31 NOTE — PRE-ANESTHESIA EVALUATION ADULT - NSANTHOSAYNRD_GEN_A_CORE
No. VLAD screening performed.  STOP BANG Legend: 0-2 = LOW Risk; 3-4 = INTERMEDIATE Risk; 5-8 = HIGH Risk

## 2025-01-31 NOTE — ASU DISCHARGE PLAN (ADULT/PEDIATRIC) - FINANCIAL ASSISTANCE
[facility] provides services at a reduced cost to those who are determined to be eligible through [facility]’s financial assistance program. Information regarding [facility]’s financial assistance program can be found by going to [email or web address] or by calling [phone].

## 2025-01-31 NOTE — ASU DISCHARGE PLAN (ADULT/PEDIATRIC) - CARE PROVIDER_API CALL
Wallace Amador  Surgery of the Hand  [address] [place] [address], [address]  [place] [address]  Phone: [phone]  Fax: [phone]  Follow Up Time:

## 2025-06-26 NOTE — ASU PATIENT PROFILE, ADULT - PATIENT REPRESENTATIVE NAME
Request is denied.  Patient needs a follow-up appointment if needs a refill.  Okay to put in my schedule later today or tomorrow for evaluation     Manuel Monsivais MD     Patient was provided the above message via e-advice encounter per Dr. Monsivais.    SHANIQUA SLATER- WIFE

## (undated) DEVICE — GLV 8 PROTEXIS (WHITE)

## (undated) DEVICE — SLV COMPRESSION KNEE MED

## (undated) DEVICE — SUT ETHILON 5-0 18" P-3

## (undated) DEVICE — MARKING PEN W RULER

## (undated) DEVICE — IMMOBILIZER HAND ALUMINUM LARGE

## (undated) DEVICE — GLV 8.5 PROTEXIS (WHITE)

## (undated) DEVICE — DRSG XEROFORM 1 X 8"

## (undated) DEVICE — PREP BETADINE SPONGE STICKS

## (undated) DEVICE — SLING ARM CHIEFTAIN MESH LARGE

## (undated) DEVICE — DRSG ACE BANDAGE 4"

## (undated) DEVICE — PREP BETADINE KIT

## (undated) DEVICE — PACK HAND

## (undated) DEVICE — MEE-ESU VALLEYLAB T9H36031DX: Type: DURABLE MEDICAL EQUIPMENT

## (undated) DEVICE — SUT PROLENE 5-0 18" P-3

## (undated) DEVICE — DRSG SPLINT FINGER PAD .75"X18"

## (undated) DEVICE — VENODYNE/SCD SLEEVE CALF MEDIUM